# Patient Record
Sex: MALE | Race: WHITE | Employment: OTHER | URBAN - METROPOLITAN AREA
[De-identification: names, ages, dates, MRNs, and addresses within clinical notes are randomized per-mention and may not be internally consistent; named-entity substitution may affect disease eponyms.]

---

## 2023-11-11 ENCOUNTER — HOSPITAL ENCOUNTER (EMERGENCY)
Facility: HOSPITAL | Age: 38
Discharge: HOME | End: 2023-11-11
Attending: STUDENT IN AN ORGANIZED HEALTH CARE EDUCATION/TRAINING PROGRAM
Payer: OTHER GOVERNMENT

## 2023-11-11 ENCOUNTER — APPOINTMENT (OUTPATIENT)
Facility: HOSPITAL | Age: 38
End: 2023-11-11
Payer: OTHER GOVERNMENT

## 2023-11-11 VITALS
SYSTOLIC BLOOD PRESSURE: 118 MMHG | WEIGHT: 215 LBS | TEMPERATURE: 97.9 F | RESPIRATION RATE: 16 BRPM | BODY MASS INDEX: 30.1 KG/M2 | OXYGEN SATURATION: 97 % | DIASTOLIC BLOOD PRESSURE: 79 MMHG | HEIGHT: 71 IN | HEART RATE: 60 BPM

## 2023-11-11 DIAGNOSIS — M51.26 LUMBAR DISC HERNIATION: ICD-10-CM

## 2023-11-11 DIAGNOSIS — M54.17 LUMBOSACRAL RADICULOPATHY: Primary | ICD-10-CM

## 2023-11-11 PROCEDURE — 99284 EMERGENCY DEPT VISIT MOD MDM: CPT

## 2023-11-11 PROCEDURE — 72131 CT LUMBAR SPINE W/O DYE: CPT

## 2023-11-11 PROCEDURE — 6370000000 HC RX 637 (ALT 250 FOR IP): Performed by: STUDENT IN AN ORGANIZED HEALTH CARE EDUCATION/TRAINING PROGRAM

## 2023-11-11 RX ORDER — PREDNISONE 10 MG/1
TABLET ORAL
Qty: 27 TABLET | Refills: 0 | Status: SHIPPED | OUTPATIENT
Start: 2023-11-11 | End: 2023-11-18

## 2023-11-11 RX ORDER — CYCLOBENZAPRINE HCL 10 MG
10 TABLET ORAL
Status: COMPLETED | OUTPATIENT
Start: 2023-11-11 | End: 2023-11-11

## 2023-11-11 RX ORDER — CYCLOBENZAPRINE HCL 10 MG
10 TABLET ORAL 3 TIMES DAILY PRN
Qty: 21 TABLET | Refills: 0 | Status: SHIPPED | OUTPATIENT
Start: 2023-11-11 | End: 2023-11-21

## 2023-11-11 RX ORDER — PREDNISONE 20 MG/1
60 TABLET ORAL
Status: COMPLETED | OUTPATIENT
Start: 2023-11-11 | End: 2023-11-11

## 2023-11-11 RX ORDER — HYDROCODONE BITARTRATE AND ACETAMINOPHEN 5; 325 MG/1; MG/1
1 TABLET ORAL EVERY 4 HOURS PRN
Qty: 30 TABLET | Refills: 0 | Status: SHIPPED | OUTPATIENT
Start: 2023-11-11 | End: 2023-11-16

## 2023-11-11 RX ORDER — PREDNISONE 20 MG/1
60 TABLET ORAL DAILY
Status: DISCONTINUED | OUTPATIENT
Start: 2023-11-11 | End: 2023-11-11

## 2023-11-11 RX ADMIN — CYCLOBENZAPRINE 10 MG: 10 TABLET, FILM COATED ORAL at 10:09

## 2023-11-11 RX ADMIN — PREDNISONE 60 MG: 20 TABLET ORAL at 10:09

## 2023-11-11 ASSESSMENT — PAIN SCALES - GENERAL: PAINLEVEL_OUTOF10: 8

## 2023-11-11 ASSESSMENT — PAIN - FUNCTIONAL ASSESSMENT
PAIN_FUNCTIONAL_ASSESSMENT: NONE - DENIES PAIN
PAIN_FUNCTIONAL_ASSESSMENT: 0-10

## 2023-11-11 ASSESSMENT — PAIN DESCRIPTION - LOCATION: LOCATION: BACK;LEG

## 2023-11-11 NOTE — ED PROVIDER NOTES
HPI    41 y.o. male presenting with pain in the lower back on the right side radiating to the right leg. He had a microdiscectomy in 2017 at L5-S1 after he had a disc herniation. He reinjured his lower back with some recurrent radicular symptoms 2 years ago. He states that he was at the gym this week and while doing squats he had recurrent pain. Pain has been gradually worsening over the course the week. Does not notice any new bowel or bladder symptoms. 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 Valerio Davies   has no past medical history on file. Physical Exam      LABORATORY TESTS:  Labs Reviewed - No data to display    IMAGING RESULTS:  CT LUMBAR SPINE WO CONTRAST   Final Result      1. No evidence of acute fracture. 2. Large right central/paracentral disc extrusion with superior migration at   L5-S1 measuring 2.0 x 0.7 cm, which results in severe right lateral recess   stenosis with mass effect on the descending right S1 nerve root, and possibly   mild mass effect on the descending right L5 nerve root. This would be better   evaluated with outpatient MRI. MEDICATIONS GIVEN:  Medications   cyclobenzaprine (FLEXERIL) tablet 10 mg (10 mg Oral Given 11/11/23 1009)   predniSONE (DELTASONE) tablet 60 mg (60 mg Oral Given 11/11/23 1009)       CONSULTS:  None         Medical Decision Making  Amount and/or Complexity of Data Reviewed  Radiology: ordered. Risk  Prescription drug management. Patient without red flag symptoms presenting with right-sided radicular back pain, has a large right disc herniation which will likely need surgery. Discussed referrals, return precautions    IMPRESSION:  1. Lumbosacral radiculopathy    2.  Lumbar disc herniation           DISPOSITION: Decision To Discharge 11/11/2023 10:35:24 AM      PATIENT REFERRED TO:  Please obtain your information for your primary care physician and make sure you schedule an appointment soon as possible    Schedule an appointment as soon as possible for a visit       [address] [facility]  [address]  [address]  [address] [facility]  [phone]  Schedule an appointment as soon as possible for a visit       Jeanie Owens MD  [id number] [address]  [phone]            DISCHARGE MEDICATIONS:  New Prescriptions    CYCLOBENZAPRINE (FLEXERIL) 10 MG TABLET    Take 1 tablet by mouth 3 times daily as needed for Muscle spasms    HYDROCODONE-ACETAMINOPHEN (NORCO) 5-325 MG PER TABLET    Take 1 tablet by mouth every 4 hours as needed for Pain for up to 5 days. Intended supply: 5 days. Take lowest dose possible to manage pain Max Daily Amount: 6 tablets    PREDNISONE (DELTASONE) 10 MG TABLET    Take 5 tablets by mouth daily for 3 days, THEN 3 tablets daily for 3 days, THEN 2 tablets daily for 1 day, THEN 1 tablet daily for 1 day. Dylan Lawrence MD      Please note that this dictation was completed with [employer], the computer voice recognition software. Quite often unanticipated grammatical, syntax, homophones, and other interpretive errors are inadvertently transcribed by the computer software. Please disregard these errors. Please excuse any errors that have escaped final proofreading.      Joy Desir MD  11/11/23 1038

## 2023-11-11 NOTE — DISCHARGE INSTRUCTIONS
Large right central/paracentral disc extrusion with superior migration at  L5-S1 measuring 2.0 x 0.7 cm, which results in severe right lateral recess  stenosis with mass effect on the descending right S1 nerve root, and possibly  mild mass effect on the descending right L5 nerve root. This would be better  evaluated with outpatient MRI.

## 2023-11-11 NOTE — ED TRIAGE NOTES
Pt arrives with c/o R low back pain that radiates down R leg Pt reports that he was in the gym this past week doing squats and felt some pain during. Pt reports that it has worsened during the week. Pt reports that he took 1000mg ibuprofen at 0830.